# Patient Record
Sex: MALE | Race: WHITE | NOT HISPANIC OR LATINO | URBAN - METROPOLITAN AREA
[De-identification: names, ages, dates, MRNs, and addresses within clinical notes are randomized per-mention and may not be internally consistent; named-entity substitution may affect disease eponyms.]

---

## 2022-04-27 ENCOUNTER — APPOINTMENT (OUTPATIENT)
Dept: URBAN - METROPOLITAN AREA SURGERY 18 | Age: 62
Setting detail: DERMATOLOGY
End: 2022-05-02

## 2022-04-27 PROBLEM — D03.39 MELANOMA IN SITU OF OTHER PARTS OF FACE: Status: ACTIVE | Noted: 2022-04-27

## 2022-04-27 PROCEDURE — OTHER COUNSELING: OTHER

## 2022-04-27 PROCEDURE — 99203 OFFICE O/P NEW LOW 30 MIN: CPT

## 2022-04-27 PROCEDURE — OTHER CONSULTATION FOR MOHS SURGERY: OTHER

## 2022-04-27 NOTE — PROCEDURE: CONSULTATION FOR MOHS SURGERY
Incorporate Mauc In Note: Yes
X Size Of Lesion In Cm (Optional): 0
Detail Level: Detailed
Date Scheduled For Mohs (Optional): 5/17/2022

## 2022-04-27 NOTE — HPI: MOHS SURGERY CONSULTATION
Has The Cancer Been Biopsied Before?: has been previously biopsied
Who Is Your Referring Provider?: Sadaf Davila MD

## 2022-06-21 ENCOUNTER — APPOINTMENT (OUTPATIENT)
Dept: URBAN - METROPOLITAN AREA SURGERY 18 | Age: 62
Setting detail: DERMATOLOGY
End: 2022-06-23

## 2022-06-21 VITALS — SYSTOLIC BLOOD PRESSURE: 127 MMHG | HEART RATE: 62 BPM | DIASTOLIC BLOOD PRESSURE: 75 MMHG

## 2022-06-21 PROBLEM — D03.39 MELANOMA IN SITU OF OTHER PARTS OF FACE: Status: ACTIVE | Noted: 2022-06-21

## 2022-06-21 PROCEDURE — OTHER COUNSELING: OTHER

## 2022-06-21 PROCEDURE — 17312 MOHS ADDL STAGE: CPT

## 2022-06-21 PROCEDURE — OTHER REFERRAL CORRESPONDENCE: OTHER

## 2022-06-21 PROCEDURE — OTHER PRESCRIPTION: OTHER

## 2022-06-21 PROCEDURE — 88342 IMHCHEM/IMCYTCHM 1ST ANTB: CPT | Mod: 59

## 2022-06-21 PROCEDURE — OTHER MOHS SURGERY WITH DEBULK SPECIMEN: OTHER

## 2022-06-21 PROCEDURE — 17311 MOHS 1 STAGE H/N/HF/G: CPT

## 2022-06-21 PROCEDURE — OTHER PATIENT SPECIFIC COUNSELING: OTHER

## 2022-06-21 PROCEDURE — 14061 TIS TRNFR E/N/E/L10.1-30SQCM: CPT

## 2022-06-21 RX ORDER — OXYCODONE HYDROCHLORIDE AND ACETAMINOPHEN 5; 325 MG/1; MG/1
TABLET ORAL
Qty: 4 | Refills: 0 | Status: ERX | COMMUNITY
Start: 2022-06-21

## 2022-06-21 NOTE — PROCEDURE: MOHS SURGERY WITH DEBULK SPECIMEN
Plan: If no improvement in 2 weeks, consider home  evaluation Initiate Treatment: Apply Triamcinolone to affected areas twice per day x 2-3 weeks \\Beryl Allegra in the morning \\nTake a Benadryl in the evening Detail Level: Zone Consent (Scalp)/Introductory Paragraph: The rationale for Mohs was explained to the patient and consent was obtained. The risks, benefits and alternatives to therapy were discussed in detail. Specifically, the risks of changes in hair growth pattern secondary to repair, infection, scarring, bleeding, prolonged wound healing, incomplete removal, allergy to anesthesia, nerve injury and recurrence were addressed. Prior to the procedure, the treatment site was clearly identified and confirmed by the patient. All components of Universal Protocol/PAUSE Rule completed.

## 2022-06-21 NOTE — PROCEDURE: MOHS SURGERY WITH DEBULK SPECIMEN
Normal vision: sees adequately in most situations; can see medication labels, newsprint Abbe Flap (Lower To Upper Lip) Text: The defect of the upper lip was assessed and measured.  Given the location and size of the defect, an Abbe flap was deemed most appropriate.  Using a sterile surgical marker, an appropriate Abbe flap was measured and drawn on the lower lip. Local anesthesia was then infiltrated. A scalpel was then used to incise the upper lip through and through the skin, vermilion, muscle and mucosa, leaving the flap pedicled on the opposite side.  The flap was then rotated and transferred to the lower lip defect.  The flap was then sutured into place with a three layer technique, closing the orbicularis oris muscle layer with subcutaneous buried sutures, followed by a mucosal layer and an epidermal layer.

## 2022-06-21 NOTE — PROCEDURE: PATIENT SPECIFIC COUNSELING
Detail Level: Zone
* Review of information discussed in prior consultation (diagnosis/prognosis/treatment plan/reconstruction plan) prior to procedure. We reviewed the potential large complex nature of the surgical defect along with the potential options for reconstruction. The patient understands more than one surgical procedure and multiple surgical revisions and minor procedures may be necessary to improve the final surgical outcome.

## 2022-06-28 ENCOUNTER — APPOINTMENT (OUTPATIENT)
Dept: URBAN - METROPOLITAN AREA SURGERY 18 | Age: 62
Setting detail: DERMATOLOGY
End: 2022-06-29

## 2022-06-28 DIAGNOSIS — Z48.817 ENCOUNTER FOR SURGICAL AFTERCARE FOLLOWING SURGERY ON THE SKIN AND SUBCUTANEOUS TISSUE: ICD-10-CM

## 2022-06-28 PROCEDURE — 99024 POSTOP FOLLOW-UP VISIT: CPT

## 2022-06-28 PROCEDURE — OTHER POST-OP WOUND CHECK: OTHER

## 2022-06-28 ASSESSMENT — LOCATION SIMPLE DESCRIPTION DERM: LOCATION SIMPLE: LEFT NOSE

## 2022-06-28 ASSESSMENT — LOCATION DETAILED DESCRIPTION DERM: LOCATION DETAILED: LEFT NASAL ALA

## 2022-06-28 ASSESSMENT — LOCATION ZONE DERM: LOCATION ZONE: NOSE

## 2022-06-28 NOTE — PROCEDURE: POST-OP WOUND CHECK
Additional Comments: Patient presents 1 week post mohs with bilobed flap reconstruction. The suture line is well approximated, well healed. The flap is mildly macerated. The patient has sebaceous skin. The patient is encouraged the leave the new bandage in place for one week unless he feels the bandage has become moist from activity/environment and if so, he can change the bandage after allowing it to air dry. He will follow up in 1 week for reevaluation, sooner with any change/new onset symptoms or concerns at surgical site.
Wound Evaluated By: Dr. Humberto Solorzano
Detail Level: Detailed
Add 80740 Cpt? (Important Note: In 2017 The Use Of 37931 Is Being Tracked By Cms To Determine Future Global Period Reimbursement For Global Periods): yes

## 2022-07-05 ENCOUNTER — APPOINTMENT (OUTPATIENT)
Dept: URBAN - METROPOLITAN AREA SURGERY 18 | Age: 62
Setting detail: DERMATOLOGY
End: 2022-07-08

## 2022-07-05 DIAGNOSIS — Z48.817 ENCOUNTER FOR SURGICAL AFTERCARE FOLLOWING SURGERY ON THE SKIN AND SUBCUTANEOUS TISSUE: ICD-10-CM

## 2022-07-05 PROCEDURE — 99024 POSTOP FOLLOW-UP VISIT: CPT

## 2022-07-05 PROCEDURE — OTHER POST-OP WOUND CHECK: OTHER

## 2022-07-05 ASSESSMENT — LOCATION ZONE DERM: LOCATION ZONE: NOSE

## 2022-07-05 ASSESSMENT — LOCATION SIMPLE DESCRIPTION DERM: LOCATION SIMPLE: LEFT NOSE

## 2022-07-05 ASSESSMENT — LOCATION DETAILED DESCRIPTION DERM: LOCATION DETAILED: LEFT NASAL ALA

## 2022-07-05 NOTE — PROCEDURE: POST-OP WOUND CHECK
Detail Level: Detailed
Additional Comments: Patient presents 2 weeks post mohs. The suture line is well approximated and healing appropriately. Flap is fully vascularized and well healed. The patient can now begin daily bandage changes with gentle cleansing. He will continue to bandage until crusting resolves. The patient will follow up in 4-6 weeks for reevaluation before he leaves town.
Add 64810 Cpt? (Important Note: In 2017 The Use Of 54805 Is Being Tracked By Cms To Determine Future Global Period Reimbursement For Global Periods): yes
Wound Evaluated By: Dr. Humberto Solorzano

## 2022-08-03 ENCOUNTER — APPOINTMENT (OUTPATIENT)
Dept: URBAN - METROPOLITAN AREA SURGERY 18 | Age: 62
Setting detail: DERMATOLOGY
End: 2022-08-05

## 2022-08-03 DIAGNOSIS — Z48.817 ENCOUNTER FOR SURGICAL AFTERCARE FOLLOWING SURGERY ON THE SKIN AND SUBCUTANEOUS TISSUE: ICD-10-CM

## 2022-08-03 PROCEDURE — OTHER POST-OP WOUND CHECK: OTHER

## 2022-08-03 PROCEDURE — 99024 POSTOP FOLLOW-UP VISIT: CPT

## 2022-08-03 ASSESSMENT — LOCATION ZONE DERM: LOCATION ZONE: NOSE

## 2022-08-03 ASSESSMENT — LOCATION DETAILED DESCRIPTION DERM: LOCATION DETAILED: LEFT NASAL ALA

## 2022-08-03 ASSESSMENT — LOCATION SIMPLE DESCRIPTION DERM: LOCATION SIMPLE: LEFT NOSE

## 2022-08-03 NOTE — PROCEDURE: POST-OP WOUND CHECK
Additional Comments: Patient presents for 5 week wound check. Flap site well vascularized well contoured. Patient is overall happy with results. Spoke with him about options for revision including dermabrasion and flap thinning to recreate the normal position of the alar crease and decrease volume near lateral tip/anterior ala as necessary (if fails to contract with continued wound healing/scar maturation). Patient to begin daily massage and follow up in 12 weeks to plan revision surgery. Patient educated on importance of daily sunscreen use as well as sun protection to this area.
Wound Evaluated By: Dr. Humberto Solorzano
Detail Level: Detailed
Add 18563 Cpt? (Important Note: In 2017 The Use Of [id number] Is Being Tracked By Cms To Determine Future Global Period Reimbursement For Global Periods): yes

## 2022-11-09 ENCOUNTER — APPOINTMENT (OUTPATIENT)
Dept: URBAN - METROPOLITAN AREA SURGERY 18 | Age: 62
Setting detail: DERMATOLOGY
End: 2022-11-13

## 2022-11-09 DIAGNOSIS — Z48.817 ENCOUNTER FOR SURGICAL AFTERCARE FOLLOWING SURGERY ON THE SKIN AND SUBCUTANEOUS TISSUE: ICD-10-CM

## 2022-11-09 PROCEDURE — OTHER POST-OP WOUND CHECK: OTHER

## 2022-11-09 ASSESSMENT — LOCATION ZONE DERM: LOCATION ZONE: NOSE

## 2022-11-09 ASSESSMENT — LOCATION SIMPLE DESCRIPTION DERM: LOCATION SIMPLE: LEFT NOSE

## 2022-11-09 ASSESSMENT — LOCATION DETAILED DESCRIPTION DERM: LOCATION DETAILED: LEFT NASAL ALA

## 2022-11-09 NOTE — PROCEDURE: POST-OP WOUND CHECK
Detail Level: Detailed
Add 78855 Cpt? (Important Note: In 2017 The Use Of [id number] Is Being Tracked By Cms To Determine Future Global Period Reimbursement For Global Periods): yes
Wound Evaluated By: Dr. Humberto Solorzano
Additional Comments: Patient presents for 20 week wound check following Mohs surgery with a Bilobed Transposition Flap repair. Flap site appears well vascularized and well contoured, well healed. Patient denies any complaints or concerns. Spoke to patient regarding options for revision including dermabrasion and flap thinning to revise the contour of the alar crease and decrease volume of flap. Patient interested in going forward with dermabrasion first, allowing to fully heal and then re asses to see if further revision treatment is necessary. Educated patient on importance of daily sunscreen to area. Patient to follow up with dermabrasion appointment after Holidays, in January.